# Patient Record
Sex: MALE | Race: WHITE | Employment: FULL TIME | URBAN - METROPOLITAN AREA
[De-identification: names, ages, dates, MRNs, and addresses within clinical notes are randomized per-mention and may not be internally consistent; named-entity substitution may affect disease eponyms.]

---

## 2019-06-03 ENCOUNTER — CONVERSION ENCOUNTER (OUTPATIENT)
Dept: CARDIOLOGY | Facility: CLINIC | Age: 59
End: 2019-06-03

## 2019-06-04 VITALS
HEART RATE: 72 BPM | WEIGHT: 249 LBS | BODY MASS INDEX: 37.74 KG/M2 | SYSTOLIC BLOOD PRESSURE: 157 MMHG | DIASTOLIC BLOOD PRESSURE: 93 MMHG | HEIGHT: 68 IN

## 2019-06-06 NOTE — PROGRESS NOTES
Visit Type:  Follow-up Visit    CC:  6 mo f/u palps.    History of Present Illness:  I am pleased to see Mr. ORDONEZ in my office today     As you know, patient is 58-year-old white gentleman whose past medical history significant for hypertension, palpitation, PVCs, who  came today for Follow-up    In 2012 patient was presented at [facility] with symptoms of palpitation.  His blood pressure was poorly controlled.  Patient underwent echocardiogram which showed preserved left ventricular function and no significant valvular heart disease.  Patient   underwent Holter monitor which showed frequent PVCs and PACs.  Patient was recommended to proceed with sleep studies and was found to have obstructive sleep apnea and patient was started on CPAP.  Patient was also started on losartan / hydrochlorothiazide   for control of blood pressure.     Patient came today and he is doing well from cardiac standpoint.  He brought his blood pressure log book and all blood pressures are within desirable range his blood pressure today is elevated but there is no blood pressure reading at home which is above   140. Patient denies any chest pain.  No palpitation or orthopnea or PND.  Patient has sleep apnea and uses CPAP.    EKG showed sinus rhythm with PVCs.    Patient still have PVCs.  But I think it is probably due to underlying severe sleep apnea however patient is compliant with his  CPAP.  I would recommend to proceed with current treatment.  I intend to see the patient in 1 year      Past Medical History:     Reviewed history from 11/19/2018 and no changes required:        Hypertension    Active Medications (reviewed today):  HYDROCODONE-ACETAMINOPHEN 7.5-325 MG ORAL TABLET (HYDROCODONE-ACETAMINOPHEN) qd 6 hours as needed.  BACLOFEN 10 MG ORAL TABLET (BACLOFEN) q 6 hours as needed  MELOXICAM 15 MG ORAL TABLET (MELOXICAM) qd  ANASTROZOLE 1 MG ORAL TABLET (ANASTROZOLE) 1/2 tab q day  LOSARTAN POTASSIUM-HCTZ 100-25 MG ORAL TABLET  (LOSARTAN POTASSIUM-HCTZ) qd  DOXAZOSIN MESYLATE 8 MG ORAL TABLET (DOXAZOSIN MESYLATE) 1 1/2 tab daily    Current Allergies (reviewed today):  No known allergies    Current Medications (including medications started today):   ANASTROZOLE 1 MG ORAL TABLET (ANASTROZOLE) 1/2 tab q day M-F  LOSARTAN POTASSIUM-HCTZ 100-25 MG ORAL TABLET (LOSARTAN POTASSIUM-HCTZ) qd  DOXAZOSIN MESYLATE 8 MG ORAL TABLET (DOXAZOSIN MESYLATE) BID    Family History Summary:      Reviewed history Last on 2018 and no changes required:2019  Father - Has Family History of Heart Disease - Entered On: 2018      Social History:     Reviewed history from 2018 and no changes required:        MarriedCaffeine Per day - none        no children          Review of Systems   General: No fatigue or tiredness  Eyes: No redness  Ear/Nose/Throat: No discharge  Cardiovascular: No chest pain  Respiratory:  shortness of breath  Gastrointestinal: No nausea or vomiting  Genitourinary: No Bleeding  Musculoskeletal: No arthralgia or myalgia  Skin: No rash  Neurologic: No numbness or tingling  Psychiatric: No anxiety or depression  Hematologic/Lymphatic: No abnormal bleeding      Vital Signs:    Patient Profile:    59 Years Old Male  Height:     68 inches  Weight:     249 pounds  BMI:        37.86     Pulse rate: 72 / minute  BP Sittin / 93        Problems: Active problems were reviewed with the patient during this visit.  Medications: Medications were reviewed with the patient during this visit.  Allergies: Allergies were reviewed with the patient during this visit.  No Known Allergy.          Physical Exam    General:      well developed, well nourished, in no acute distress.    Head:      normocephalic and atraumatic.    Neck:      no masses, thyromegaly, or abnormal cervical nodes.    Chest Wall:       no deformity  Lungs:      clear bilaterally to auscultation.    Heart:      non-displaced PMI, chest non-tender; regular rate and  rhythm, S1, S2 without murmurs, rubs, or gallops  Abdomen:      non-tender.    Pulses:      pulses normal in upper extremities.    Extremities:      no clubbing, cyanosis, edema   Neurologic:      no focal deficits   Skin:      intact without lesions or rashes.      Diabetes Management Exam:      Foot Exam (with socks and/or shoes not present):        Pulses:           pulses normal in upper extremities.        Blood Pressure:  Today's BP: 157/93 mm Hg            Impression & Recommendations:    Problem # 1:  Hypertension (MSR93-T54)  patient's blood pressure is within desirable range. At this time, I would not recommend any change in antihypertensive regimen. However, patient is advised to check the blood pressure periodically at home and bring the logbook on next visit. Patient is   also encouraged to increase aerobic activities as tolerated. Risk factor modification is discussed.    His updated medication list for this problem includes:     Losartan Potassium-hctz 100-25 Mg Oral Tablet (Losartan potassium-hctz) ..... Qd     Doxazosin Mesylate 8 Mg Oral Tablet (Doxazosin mesylate) ..... Bid    Orders:  Ofc Vst, Est Level III (61078)      Problem # 2:  PALPITATIONS (ICD-785.1) (BAL81-G56.2)   his symptom of palpitations are contained but patient still have PVCs.  Orders:  EKG Intrepretation (CPT-83408)  Ofc Vst, Est Level III (03782)      Medications Added to Medication List This Visit:  1)  Anastrozole 1 Mg Oral Tablet (Anastrozole) .... 1/2 tab q day m-f  2)  Doxazosin Mesylate 8 Mg Oral Tablet (Doxazosin mesylate) .... Bid      Patient Instructions:  1)  Advised to reduce the total intake of fats, especially to choose foods that are low in saturated fat.  2)  Limit intake of Sodium (Salt).  3)  Discussed importance of regular exercise and recommended starting or continuing a regular exercise program for good health.  4)  The patient was encouraged to lose weight for better health.                Medication  Administration    Orders Added:  1)  EKG Intrepretation [CPT-44108]  2)  Ofc Vst, Est Level III [97257]  ]      Electronically signed by Thomas Rodriguez MD on 06/03/2019 at 2:49 PM  ________________________________________________________________________       Disclaimer: Converted Note message may not contain all data elements that existed in the legacy source system. Please see [employer] Legacy System for the original note details.

## 2019-06-13 NOTE — PROCEDURES
song rpt : cardiac science      Imported By: Fátima Velasquez 6/11/2019 9:10:55 AM    _____________________________________________________________________    External Attachment:      Type: Image      Comment:  External Document      Signed before import by Thomas Rodriguez MD  Filed automatically on 06/11/2019 at 9:11 AM  ________________________________________________________________________       Disclaimer: Converted Note message may not contain all data elements that existed in the legacy source system. Please see [facility] System for the original note details.

## 2020-03-03 PROBLEM — R00.2 PALPITATIONS: Status: ACTIVE | Noted: 2018-11-19

## 2020-03-03 PROBLEM — I10 HYPERTENSION: Status: ACTIVE | Noted: 2018-11-19

## 2020-03-09 ENCOUNTER — OFFICE VISIT (OUTPATIENT)
Dept: CARDIOLOGY | Facility: CLINIC | Age: 60
End: 2020-03-09

## 2020-03-09 VITALS
HEART RATE: 76 BPM | BODY MASS INDEX: 35.01 KG/M2 | DIASTOLIC BLOOD PRESSURE: 79 MMHG | WEIGHT: 231 LBS | SYSTOLIC BLOOD PRESSURE: 121 MMHG | HEIGHT: 68 IN

## 2020-03-09 DIAGNOSIS — I49.3 PVC (PREMATURE VENTRICULAR CONTRACTION): ICD-10-CM

## 2020-03-09 DIAGNOSIS — R00.2 PALPITATIONS: ICD-10-CM

## 2020-03-09 DIAGNOSIS — I10 ESSENTIAL HYPERTENSION: Primary | ICD-10-CM

## 2020-03-09 PROCEDURE — 99213 OFFICE O/P EST LOW 20 MIN: CPT | Performed by: INTERNAL MEDICINE

## 2020-03-09 RX ORDER — LOSARTAN POTASSIUM 100 MG/1
100 TABLET ORAL DAILY
COMMUNITY
Start: 2020-02-01

## 2020-03-09 RX ORDER — PREDNISONE 1 MG/1
5 TABLET ORAL DAILY
COMMUNITY
Start: 2020-02-08 | End: 2021-03-15

## 2020-03-09 RX ORDER — DOXAZOSIN 8 MG/1
TABLET ORAL
COMMUNITY
Start: 2019-12-22

## 2020-03-09 RX ORDER — HYDROCHLOROTHIAZIDE 25 MG/1
25 TABLET ORAL EVERY MORNING
COMMUNITY
Start: 2020-02-01

## 2020-03-09 RX ORDER — ANASTROZOLE 1 MG/1
0.5 TABLET ORAL TAKE AS DIRECTED
COMMUNITY
Start: 2020-02-29

## 2020-03-09 NOTE — PROGRESS NOTES
Date of Office Visit: 2020  Encounter Provider: Dr. Thomas Rodriguez  Place of Service: [facility] [place]  Patient Name: Darrion Torres  :1960  Bharath Kilpatrick MD    Chief Complaint   Patient presents with   • Hypertension     9 month follow up    • Palpitations     History of Present Illness    I am pleased to see Mr. Torres in my office today     As you know, patient is 60-year-old white gentleman whose past medical history significant for hypertension, palpitation, PVCs, who  came today for Follow-up    In  patient was presented at Cardiac Clinic with symptoms of palpitation.  His blood pressure was poorly controlled.  Patient underwent echocardiogram which showed preserved left ventricular function and no significant valvular heart disease.  Patient underwent Holter monitor which showed frequent PVCs and PACs.  Patient was recommended to proceed with sleep studies and was found to have obstructive sleep apnea and patient was started on CPAP.  Patient was also started on losartan / hydrochlorothiazide for control of blood pressure.    Since the previous visit, patient is doing fairly well from cardiovascular standpoint.  Patient denies any symptom of chest pain or tightness or heaviness.  No orthopnea, PND, syncope or presyncope.  No leg edema noted.  No palpitation.  No dizziness and lightheadedness.    EKG showed normal sinus rhythm.  No PVCs.  As compared to previous EKG no PVCs noted.    At this stage patient is doing fairly well.  His blood pressure is very well controlled.  He does not have palpitation.  He is compliant with his CPAP.  I am overall pleased with the patient progress and symptoms.  Continue current treatment        History reviewed. No pertinent past medical history.      History reviewed. No pertinent surgical history.        Current Outpatient Medications:   •  anastrozole (ARIMIDEX) 1 MG tablet, Take 0.5 mg by mouth Take As Directed. 5 times a week, Disp: ,  "Rfl:   •  doxazosin (CARDURA) 8 MG tablet, , Disp: , Rfl:   •  hydroCHLOROthiazide (HYDRODIURIL) 25 MG tablet, Take 25 mg by mouth Every Morning., Disp: , Rfl:   •  losartan (COZAAR) 100 MG tablet, Take 100 mg by mouth Daily., Disp: , Rfl:   •  predniSONE (DELTASONE) 5 MG tablet, Take 5 mg by mouth Daily., Disp: , Rfl:       Social History     Socioeconomic History   • Marital status:      Spouse name: Not on file   • Number of children: Not on file   • Years of education: Not on file   • Highest education level: Not on file   Tobacco Use   • Smoking status: Never Smoker   • Smokeless tobacco: Never Used   Substance and Sexual Activity   • Alcohol use: Not Currently   • Drug use: Never   • Sexual activity: Defer         Review of Systems   Constitution: Negative for chills and fever.   HENT: Negative for ear discharge and nosebleeds.    Eyes: Negative for discharge and redness.   Cardiovascular: Negative for chest pain, orthopnea, palpitations, paroxysmal nocturnal dyspnea and syncope.   Respiratory: Negative for cough, shortness of breath and wheezing.    Endocrine: Negative for heat intolerance.   Skin: Negative for rash.   Musculoskeletal: Negative for arthritis and myalgias.   Gastrointestinal: Negative for abdominal pain, melena, nausea and vomiting.   Genitourinary: Negative for dysuria and hematuria.   Neurological: Negative for dizziness, light-headedness, numbness and tremors.   Psychiatric/Behavioral: Negative for depression. The patient is not nervous/anxious.        Procedures    Procedures    No orders to display           Objective:    /79   Pulse 76   Ht 172.7 cm (67.99\")   Wt 105 kg (231 lb)   BMI 35.13 kg/m²         Physical Exam   Constitutional: He is oriented to person, place, and time. He appears well-developed and well-nourished.   HENT:   Head: Normocephalic and atraumatic.   Eyes: Right eye exhibits no discharge. No scleral icterus.   Neck: No thyromegaly present. "   Cardiovascular: Normal rate, regular rhythm and normal heart sounds. Exam reveals no gallop and no friction rub.   No murmur heard.  Pulmonary/Chest: Effort normal and breath sounds normal. No respiratory distress. He has no wheezes. He has no rales.   Abdominal: There is no tenderness.   Musculoskeletal: He exhibits no edema.   Lymphadenopathy:     He has no cervical adenopathy.   Neurological: He is alert and oriented to person, place, and time.   Skin: No rash noted. No erythema.   Psychiatric: He has a normal mood and affect.           Assessment:       Diagnosis Plan   1. Essential hypertension     2. Palpitations     3. PVC (premature ventricular contraction)              Plan:       At this stage I would recommend that patient continue current treatment.  I will see the patient in 1 year

## 2020-07-24 ENCOUNTER — OFFICE (AMBULATORY)
Dept: URBAN - METROPOLITAN AREA CLINIC 64 | Facility: CLINIC | Age: 60
End: 2020-07-24

## 2020-07-24 VITALS
WEIGHT: 209 LBS | HEART RATE: 87 BPM | DIASTOLIC BLOOD PRESSURE: 91 MMHG | HEIGHT: 69 IN | SYSTOLIC BLOOD PRESSURE: 144 MMHG

## 2020-07-24 DIAGNOSIS — R74.0 NONSPECIFIC ELEVATION OF LEVELS OF TRANSAMINASE AND LACTIC A: ICD-10-CM

## 2020-07-24 DIAGNOSIS — Z86.010 PERSONAL HISTORY OF COLONIC POLYPS: ICD-10-CM

## 2020-07-24 DIAGNOSIS — R10.31 RIGHT LOWER QUADRANT PAIN: ICD-10-CM

## 2020-07-24 PROCEDURE — 99244 OFF/OP CNSLTJ NEW/EST MOD 40: CPT | Performed by: INTERNAL MEDICINE

## 2020-07-24 RX ORDER — PREDNISONE 5 MG/1
TABLET ORAL
Qty: 0 | Refills: 0 | Status: COMPLETED
End: 2020-07-24

## 2020-08-18 ENCOUNTER — ON CAMPUS - OUTPATIENT (AMBULATORY)
Dept: URBAN - METROPOLITAN AREA HOSPITAL 2 | Facility: HOSPITAL | Age: 60
End: 2020-08-18
Payer: COMMERCIAL

## 2020-08-18 ENCOUNTER — OFFICE (AMBULATORY)
Dept: URBAN - METROPOLITAN AREA PATHOLOGY 4 | Facility: PATHOLOGY | Age: 60
End: 2020-08-18
Payer: COMMERCIAL

## 2020-08-18 VITALS
OXYGEN SATURATION: 100 % | HEIGHT: 69 IN | RESPIRATION RATE: 17 BRPM | HEART RATE: 83 BPM | SYSTOLIC BLOOD PRESSURE: 128 MMHG | HEART RATE: 58 BPM | OXYGEN SATURATION: 99 % | DIASTOLIC BLOOD PRESSURE: 75 MMHG | SYSTOLIC BLOOD PRESSURE: 118 MMHG | TEMPERATURE: 97.3 F | HEART RATE: 76 BPM | DIASTOLIC BLOOD PRESSURE: 80 MMHG | OXYGEN SATURATION: 98 % | SYSTOLIC BLOOD PRESSURE: 138 MMHG | HEART RATE: 64 BPM | RESPIRATION RATE: 16 BRPM | SYSTOLIC BLOOD PRESSURE: 121 MMHG | OXYGEN SATURATION: 96 % | DIASTOLIC BLOOD PRESSURE: 81 MMHG | DIASTOLIC BLOOD PRESSURE: 71 MMHG | DIASTOLIC BLOOD PRESSURE: 79 MMHG | SYSTOLIC BLOOD PRESSURE: 131 MMHG | HEART RATE: 84 BPM | HEART RATE: 59 BPM | SYSTOLIC BLOOD PRESSURE: 126 MMHG | SYSTOLIC BLOOD PRESSURE: 129 MMHG | SYSTOLIC BLOOD PRESSURE: 111 MMHG | DIASTOLIC BLOOD PRESSURE: 82 MMHG | HEART RATE: 61 BPM | RESPIRATION RATE: 18 BRPM | DIASTOLIC BLOOD PRESSURE: 74 MMHG | SYSTOLIC BLOOD PRESSURE: 137 MMHG | WEIGHT: 204 LBS | HEART RATE: 63 BPM | OXYGEN SATURATION: 97 %

## 2020-08-18 DIAGNOSIS — D12.2 BENIGN NEOPLASM OF ASCENDING COLON: ICD-10-CM

## 2020-08-18 DIAGNOSIS — K63.5 POLYP OF COLON: ICD-10-CM

## 2020-08-18 DIAGNOSIS — R10.31 RIGHT LOWER QUADRANT PAIN: ICD-10-CM

## 2020-08-18 DIAGNOSIS — Z86.010 PERSONAL HISTORY OF COLONIC POLYPS: ICD-10-CM

## 2020-08-18 DIAGNOSIS — D12.0 BENIGN NEOPLASM OF CECUM: ICD-10-CM

## 2020-08-18 DIAGNOSIS — K57.30 DIVERTICULOSIS OF LARGE INTESTINE WITHOUT PERFORATION OR ABS: ICD-10-CM

## 2020-08-18 LAB
GI HISTOLOGY: A. UNSPECIFIED: (no result)
GI HISTOLOGY: B. UNSPECIFIED: (no result)
GI HISTOLOGY: PDF REPORT: (no result)

## 2020-08-18 PROCEDURE — 88305 TISSUE EXAM BY PATHOLOGIST: CPT | Mod: 33 | Performed by: INTERNAL MEDICINE

## 2020-08-18 PROCEDURE — 45385 COLONOSCOPY W/LESION REMOVAL: CPT | Performed by: INTERNAL MEDICINE

## 2020-09-13 PROBLEM — K57.30 DVRTCLOS OF LG INT W/O PERFORATION OR ABSCESS W/O BLEEDING: Status: ACTIVE | Noted: 2020-08-18

## 2020-10-30 ENCOUNTER — OFFICE (AMBULATORY)
Dept: URBAN - METROPOLITAN AREA CLINIC 64 | Facility: CLINIC | Age: 60
End: 2020-10-30

## 2020-10-30 VITALS
WEIGHT: 210 LBS | DIASTOLIC BLOOD PRESSURE: 100 MMHG | SYSTOLIC BLOOD PRESSURE: 153 MMHG | HEART RATE: 70 BPM | HEIGHT: 69 IN

## 2020-10-30 DIAGNOSIS — K76.9 LIVER DISEASE, UNSPECIFIED: ICD-10-CM

## 2020-10-30 DIAGNOSIS — Z86.010 PERSONAL HISTORY OF COLONIC POLYPS: ICD-10-CM

## 2020-10-30 PROCEDURE — 99213 OFFICE O/P EST LOW 20 MIN: CPT | Performed by: INTERNAL MEDICINE

## 2021-03-15 ENCOUNTER — OFFICE VISIT (OUTPATIENT)
Dept: CARDIOLOGY | Facility: CLINIC | Age: 61
End: 2021-03-15

## 2021-03-15 VITALS
OXYGEN SATURATION: 98 % | SYSTOLIC BLOOD PRESSURE: 128 MMHG | WEIGHT: 218 LBS | HEIGHT: 68 IN | DIASTOLIC BLOOD PRESSURE: 81 MMHG | HEART RATE: 62 BPM | BODY MASS INDEX: 33.04 KG/M2

## 2021-03-15 DIAGNOSIS — I10 ESSENTIAL HYPERTENSION: Primary | ICD-10-CM

## 2021-03-15 DIAGNOSIS — R00.2 PALPITATIONS: ICD-10-CM

## 2021-03-15 PROCEDURE — 99212 OFFICE O/P EST SF 10 MIN: CPT | Performed by: INTERNAL MEDICINE

## 2021-03-15 NOTE — PROGRESS NOTES
Date of Office Visit: 03/15/2021  Encounter Provider: Dr. Thomas Rodriguez      Place of Service: [facility]ARDI[facility] [place]  Patient Name: Darrion Torres  :1960  Bharath Kilpatrick MD    Chief Complaint   Patient presents with   • Annual Exam   • Hypertension     History of Present Illness    I am pleased to see Mr. Torres in my office today as a follow-up    As you know, patient is 61-year-old white gentleman whose past medical history significant for hypertension, palpitation, PVCs, who  came today for Follow-up    In  patient was presented at Cardiac Clinic with symptoms of palpitation.  His blood pressure was poorly controlled.  Patient underwent echocardiogram which showed preserved left ventricular function and no significant valvular heart disease.  Patient underwent Holter monitor which showed frequent PVCs and PACs.  Patient was recommended to proceed with sleep studies and was found to have obstructive sleep apnea and patient was started on CPAP.  Patient was also started on losartan / hydrochlorothiazide for control of blood pressure.    Since the previous visit, patient was able to lose weight.  He is exercising.  Patient denies any symptom of chest pain or tightness or heaviness.  No orthopnea PND no syncope or presyncope.  No leg edema noted.    EKG showed normal sinus rhythm no changes from previous EKG.    His blood pressure is very well controlled.  Patient is not complaining any further episode of palpitation.  I am overall pleased with the patient progress.  I will continue current treatment.  I will see the patient as needed in future            Past Medical History:   Diagnosis Date   • Hypertension          Past Surgical History:   Procedure Laterality Date   • SKIN LESION EXCISION      mela           Current Outpatient Medications:   •  anastrozole (ARIMIDEX) 1 MG tablet, Take 0.5 mg by mouth Take As Directed. 5 times a week, Disp: , Rfl:   •  doxazosin (CARDURA) 8 MG  "tablet, , Disp: , Rfl:   •  hydroCHLOROthiazide (HYDRODIURIL) 25 MG tablet, Take 25 mg by mouth Every Morning., Disp: , Rfl:   •  losartan (COZAAR) 100 MG tablet, Take 100 mg by mouth Daily., Disp: , Rfl:       Social History     Socioeconomic History   • Marital status:      Spouse name: Not on file   • Number of children: Not on file   • Years of education: Not on file   • Highest education level: Not on file   Tobacco Use   • Smoking status: Never Smoker   • Smokeless tobacco: Never Used   Vaping Use   • Vaping Use: Never used   Substance and Sexual Activity   • Alcohol use: Not Currently   • Drug use: Never   • Sexual activity: Defer         Review of Systems   Constitutional: Negative for chills and fever.   HENT: Negative for ear discharge and nosebleeds.    Eyes: Negative for discharge and redness.   Cardiovascular: Negative for chest pain, orthopnea, palpitations, paroxysmal nocturnal dyspnea and syncope.   Respiratory: Negative for cough, shortness of breath and wheezing.    Endocrine: Negative for heat intolerance.   Skin: Negative for rash.   Musculoskeletal: Negative for arthritis and myalgias.   Gastrointestinal: Negative for abdominal pain, melena, nausea and vomiting.   Genitourinary: Negative for dysuria and hematuria.   Neurological: Negative for dizziness, light-headedness, numbness and tremors.   Psychiatric/Behavioral: Negative for depression. The patient is not nervous/anxious.        Procedures    Procedures    No orders to display           Objective:    /81   Pulse 62   Ht 172.7 cm (67.99\")   Wt 98.9 kg (218 lb)   SpO2 98%   BMI 33.15 kg/m²         Constitutional:       Appearance: Well-developed.   Eyes:      General: No scleral icterus.        Right eye: No discharge.   HENT:      Head: Normocephalic and atraumatic.   Neck:      Thyroid: No thyromegaly.      Lymphadenopathy: No cervical adenopathy.   Pulmonary:      Effort: Pulmonary effort is normal. No respiratory " distress.      Breath sounds: Normal breath sounds. No wheezing. No rales.   Cardiovascular:      Normal rate. Regular rhythm.      No gallop.   Abdominal:      Tenderness: There is no abdominal tenderness.   Skin:     Findings: No erythema or rash.   Neurological:      Mental Status: Alert and oriented to person, place, and time.             Assessment:       Diagnosis Plan   1. Essential hypertension     2. Palpitations              Plan:       MDM:    1.  Palpitation:    Patient does not have any further episode of palpitation.    2.  Hypertension:    His blood pressure is very well controlled.  He is able to lose weight.  I will continue this therapy but I would recommend if he continues to lose weight losartan dose can be decreased.  We discussed and patient agreed to follow-up with his PCP and I will see the patient as needed.  There is no active cardiac issue at present